# Patient Record
Sex: MALE | Race: WHITE | NOT HISPANIC OR LATINO | URBAN - METROPOLITAN AREA
[De-identification: names, ages, dates, MRNs, and addresses within clinical notes are randomized per-mention and may not be internally consistent; named-entity substitution may affect disease eponyms.]

---

## 2018-10-21 ENCOUNTER — EMERGENCY (EMERGENCY)
Facility: HOSPITAL | Age: 78
LOS: 1 days | Discharge: HOME | End: 2018-10-21
Attending: EMERGENCY MEDICINE | Admitting: HOSPITALIST
Payer: MEDICARE

## 2018-10-21 VITALS
OXYGEN SATURATION: 96 % | SYSTOLIC BLOOD PRESSURE: 122 MMHG | HEART RATE: 67 BPM | RESPIRATION RATE: 16 BRPM | DIASTOLIC BLOOD PRESSURE: 82 MMHG

## 2018-10-21 DIAGNOSIS — E78.5 HYPERLIPIDEMIA, UNSPECIFIED: ICD-10-CM

## 2018-10-21 DIAGNOSIS — N17.9 ACUTE KIDNEY FAILURE, UNSPECIFIED: ICD-10-CM

## 2018-10-21 DIAGNOSIS — R55 SYNCOPE AND COLLAPSE: ICD-10-CM

## 2018-10-21 DIAGNOSIS — I10 ESSENTIAL (PRIMARY) HYPERTENSION: ICD-10-CM

## 2018-10-21 DIAGNOSIS — Z29.9 ENCOUNTER FOR PROPHYLACTIC MEASURES, UNSPECIFIED: ICD-10-CM

## 2018-10-21 LAB
ALBUMIN SERPL ELPH-MCNC: 3.6 G/DL — SIGNIFICANT CHANGE UP (ref 3.3–5)
ALP SERPL-CCNC: 63 U/L — SIGNIFICANT CHANGE UP (ref 40–120)
ALT FLD-CCNC: 8 U/L — LOW (ref 10–45)
ANION GAP SERPL CALC-SCNC: 13 MMOL/L — SIGNIFICANT CHANGE UP (ref 5–17)
AST SERPL-CCNC: 14 U/L — SIGNIFICANT CHANGE UP (ref 10–40)
BASOPHILS # BLD AUTO: 0 K/UL — SIGNIFICANT CHANGE UP (ref 0–0.2)
BASOPHILS NFR BLD AUTO: 0.4 % — SIGNIFICANT CHANGE UP (ref 0–2)
BILIRUB SERPL-MCNC: 0.2 MG/DL — SIGNIFICANT CHANGE UP (ref 0.2–1.2)
BUN SERPL-MCNC: 37 MG/DL — HIGH (ref 7–23)
CALCIUM SERPL-MCNC: 8.8 MG/DL — SIGNIFICANT CHANGE UP (ref 8.4–10.5)
CHLORIDE SERPL-SCNC: 104 MMOL/L — SIGNIFICANT CHANGE UP (ref 96–108)
CO2 SERPL-SCNC: 23 MMOL/L — SIGNIFICANT CHANGE UP (ref 22–31)
CREAT SERPL-MCNC: 1.87 MG/DL — HIGH (ref 0.5–1.3)
D DIMER BLD IA.RAPID-MCNC: 2587 NG/ML DDU — HIGH
EOSINOPHIL # BLD AUTO: 0.1 K/UL — SIGNIFICANT CHANGE UP (ref 0–0.5)
EOSINOPHIL NFR BLD AUTO: 1.4 % — SIGNIFICANT CHANGE UP (ref 0–6)
GLUCOSE SERPL-MCNC: 124 MG/DL — HIGH (ref 70–99)
HCT VFR BLD CALC: 39.8 % — SIGNIFICANT CHANGE UP (ref 39–50)
HGB BLD-MCNC: 13.1 G/DL — SIGNIFICANT CHANGE UP (ref 13–17)
LYMPHOCYTES # BLD AUTO: 1 K/UL — SIGNIFICANT CHANGE UP (ref 1–3.3)
LYMPHOCYTES # BLD AUTO: 11.6 % — LOW (ref 13–44)
MAGNESIUM SERPL-MCNC: 2.2 MG/DL — SIGNIFICANT CHANGE UP (ref 1.6–2.6)
MCHC RBC-ENTMCNC: 28.3 PG — SIGNIFICANT CHANGE UP (ref 27–34)
MCHC RBC-ENTMCNC: 33 GM/DL — SIGNIFICANT CHANGE UP (ref 32–36)
MCV RBC AUTO: 85.9 FL — SIGNIFICANT CHANGE UP (ref 80–100)
MONOCYTES # BLD AUTO: 0.5 K/UL — SIGNIFICANT CHANGE UP (ref 0–0.9)
MONOCYTES NFR BLD AUTO: 5.9 % — SIGNIFICANT CHANGE UP (ref 2–14)
NEUTROPHILS # BLD AUTO: 7.2 K/UL — SIGNIFICANT CHANGE UP (ref 1.8–7.4)
NEUTROPHILS NFR BLD AUTO: 80.7 % — HIGH (ref 43–77)
NT-PROBNP SERPL-SCNC: 745 PG/ML — HIGH (ref 0–300)
PHOSPHATE SERPL-MCNC: 4.2 MG/DL — SIGNIFICANT CHANGE UP (ref 2.5–4.5)
PLATELET # BLD AUTO: 206 K/UL — SIGNIFICANT CHANGE UP (ref 150–400)
POTASSIUM SERPL-MCNC: 4.6 MMOL/L — SIGNIFICANT CHANGE UP (ref 3.5–5.3)
POTASSIUM SERPL-SCNC: 4.6 MMOL/L — SIGNIFICANT CHANGE UP (ref 3.5–5.3)
PROT SERPL-MCNC: 7.2 G/DL — SIGNIFICANT CHANGE UP (ref 6–8.3)
RBC # BLD: 4.63 M/UL — SIGNIFICANT CHANGE UP (ref 4.2–5.8)
RBC # FLD: 13.4 % — SIGNIFICANT CHANGE UP (ref 10.3–14.5)
SODIUM SERPL-SCNC: 140 MMOL/L — SIGNIFICANT CHANGE UP (ref 135–145)
TROPONIN T, HIGH SENSITIVITY RESULT: 22 NG/L — SIGNIFICANT CHANGE UP (ref 0–51)
TROPONIN T, HIGH SENSITIVITY RESULT: 27 NG/L — SIGNIFICANT CHANGE UP (ref 0–51)
WBC # BLD: 8.9 K/UL — SIGNIFICANT CHANGE UP (ref 3.8–10.5)
WBC # FLD AUTO: 8.9 K/UL — SIGNIFICANT CHANGE UP (ref 3.8–10.5)

## 2018-10-21 RX ORDER — ATORVASTATIN CALCIUM 80 MG/1
40 TABLET, FILM COATED ORAL AT BEDTIME
Qty: 0 | Refills: 0 | Status: DISCONTINUED | OUTPATIENT
Start: 2018-10-21 | End: 2018-10-21

## 2018-10-21 RX ORDER — AMLODIPINE BESYLATE 2.5 MG/1
10 TABLET ORAL DAILY
Qty: 0 | Refills: 0 | Status: DISCONTINUED | OUTPATIENT
Start: 2018-10-21 | End: 2018-10-22

## 2018-10-21 RX ORDER — HEPARIN SODIUM 5000 [USP'U]/ML
5000 INJECTION INTRAVENOUS; SUBCUTANEOUS EVERY 8 HOURS
Qty: 0 | Refills: 0 | Status: DISCONTINUED | OUTPATIENT
Start: 2018-10-21 | End: 2018-10-22

## 2018-10-21 RX ORDER — ATORVASTATIN CALCIUM 80 MG/1
80 TABLET, FILM COATED ORAL AT BEDTIME
Qty: 0 | Refills: 0 | Status: DISCONTINUED | OUTPATIENT
Start: 2018-10-21 | End: 2018-10-22

## 2018-10-21 RX ORDER — SODIUM CHLORIDE 9 MG/ML
500 INJECTION INTRAMUSCULAR; INTRAVENOUS; SUBCUTANEOUS ONCE
Qty: 0 | Refills: 0 | Status: COMPLETED | OUTPATIENT
Start: 2018-10-21 | End: 2018-10-21

## 2018-10-21 RX ORDER — SODIUM CHLORIDE 9 MG/ML
1000 INJECTION INTRAMUSCULAR; INTRAVENOUS; SUBCUTANEOUS
Qty: 0 | Refills: 0 | Status: DISCONTINUED | OUTPATIENT
Start: 2018-10-21 | End: 2018-10-21

## 2018-10-21 RX ORDER — SODIUM CHLORIDE 9 MG/ML
1000 INJECTION INTRAMUSCULAR; INTRAVENOUS; SUBCUTANEOUS
Qty: 0 | Refills: 0 | Status: DISCONTINUED | OUTPATIENT
Start: 2018-10-21 | End: 2018-10-22

## 2018-10-21 RX ORDER — METOPROLOL TARTRATE 50 MG
50 TABLET ORAL DAILY
Qty: 0 | Refills: 0 | Status: DISCONTINUED | OUTPATIENT
Start: 2018-10-21 | End: 2018-10-22

## 2018-10-21 RX ORDER — INDOMETHACIN 50 MG
50 CAPSULE ORAL THREE TIMES A DAY
Qty: 0 | Refills: 0 | Status: DISCONTINUED | OUTPATIENT
Start: 2018-10-21 | End: 2018-10-21

## 2018-10-21 RX ORDER — ASPIRIN/CALCIUM CARB/MAGNESIUM 324 MG
81 TABLET ORAL DAILY
Qty: 0 | Refills: 0 | Status: DISCONTINUED | OUTPATIENT
Start: 2018-10-21 | End: 2018-10-22

## 2018-10-21 RX ADMIN — Medication 50 MILLIGRAM(S): at 20:01

## 2018-10-21 RX ADMIN — AMLODIPINE BESYLATE 10 MILLIGRAM(S): 2.5 TABLET ORAL at 20:01

## 2018-10-21 RX ADMIN — SODIUM CHLORIDE 75 MILLILITER(S): 9 INJECTION INTRAMUSCULAR; INTRAVENOUS; SUBCUTANEOUS at 19:45

## 2018-10-21 RX ADMIN — SODIUM CHLORIDE 500 MILLILITER(S): 9 INJECTION INTRAMUSCULAR; INTRAVENOUS; SUBCUTANEOUS at 04:48

## 2018-10-21 NOTE — ED PROVIDER NOTE - MEDICAL DECISION MAKING DETAILS
32M presenting with syncope. denies chest pain, difficulty breathing, headache or dizziness prior to syncopal episode but reports feeling "uncomfortable." was hypoxic to 80s so concern for PE vs cardiac etiology of syncope. plan for cbc, cmp, ekg, d-dimer, trop, vbg, ua, cxr. will reassess. likely admission.

## 2018-10-21 NOTE — ED ADULT NURSE REASSESSMENT NOTE - NS ED NURSE REASSESS COMMENT FT1
Patient's daughter is at bedside and is inquiring about plan of care. Explained to patient's daughter that patient is still to be seen by cardiology MD and by medicine MD who will further discuss plan of care. Still awaiting bed assignment at this time.

## 2018-10-21 NOTE — ED PROVIDER NOTE - ATTENDING CONTRIBUTION TO CARE
77 yo male presents with syncopal episode at home today. Got up from sleep to walk to bathroom and lost consciousness and fell to the ground. Denies prodrome. Upon waking had some nausea but no vomiting. No chest pain or SOB. PE: well appearing, nad MMM, lungs clear, RRR, ab soft nt/nd, no abrasions or lacerations, NCAT. A/P: Will obtain labs, ct head, d-dimer given slightly hypoxic on RA and if positive cta for PE as cause of syncope. Given lack of prodrome concerned for valvular cause vs arrythmia and will admit for further work up.

## 2018-10-21 NOTE — H&P ADULT - NSHPLABSRESULTS_GEN_ALL_CORE
LABS personally reviewed by me and significant for:    WBC = 8.9  Hb = 13.1  Plt = 206      Na = 140  K = 4.6    BUN = 37  Cr = 1.87      Glucose = 124      D -dimer = 2587      EKG: personally reviewed by me = NSR @ 71 bpm, LAD       IMAGING:    CT Head = no acute pathology    CT Angio Chest = no PE LABS personally reviewed by me and significant for:    WBC = 8.9  Hb = 13.1  Plt = 206      Na = 140  K = 4.6    BUN = 37  Cr = 1.87      Glucose = 124      D -dimer = 2587      EKG: personally reviewed by me = NSR @ 71 bpm, LAD       IMAGING:    CT Head = no acute pathology    CT Angio Chest = no PE, bibasilar ILD

## 2018-10-21 NOTE — H&P ADULT - FAMILY HISTORY
Mother  Still living? Unknown  Family history of cardiovascular disease, Age at diagnosis: Age Unknown

## 2018-10-21 NOTE — ED PROVIDER NOTE - PHYSICAL EXAMINATION
General: well appearing male, on acute distress   HEENT: normocephalic, atraumatic   Respiratory: normal work of breathing, lungs clear to auscultation bilaterally   Cardiac: regular rate and rhythm   Abdomen: soft, non-tender   MSK: no swelling or tenderness of lower extremities   Skin: no rashes   Neuro: A&Ox3, cranial nerves II-XII intact, 5/5 strength in all extremities

## 2018-10-21 NOTE — H&P ADULT - NSHPPHYSICALEXAM_GEN_ALL_CORE
Vital Signs Last 24 Hrs  T(C): 37.1 (21 Oct 2018 12:10), Max: 37.1 (21 Oct 2018 12:10)  T(F): 98.8 (21 Oct 2018 12:10), Max: 98.8 (21 Oct 2018 12:10)  HR: 64 (21 Oct 2018 12:10) (64 - 75)  BP: 159/93 (21 Oct 2018 12:10) (116/76 - 159/93)  BP(mean): --  RR: 18 (21 Oct 2018 12:10) (16 - 20)  SpO2: 97% (21 Oct 2018 12:10) (90% - 98%)    Gen - NAD  HEENT - perrla, eomi  CV - s1 and s2, rrr, no m/r/g  LUNGS - CTAB  Abd - soft, nd, nt, +bs  Ext - no c/c/e

## 2018-10-21 NOTE — H&P ADULT - PROBLEM SELECTOR PLAN 2
Cr of 1.87, unknown baseline, no prior data in sunrise, or in allscripts; pt encouraged to stay hydrated at home; cont IVF NS Cr of 1.87, unknown baseline, no prior data in sunrise, or in allscripts; pt encouraged to stay hydrated at home; cont IVF NS  -will hold indomethacin, In setting of VANESSA, indomethacin prescribed for gout; if pt has worsening gout symptoms, may need rheum consult for management

## 2018-10-21 NOTE — H&P ADULT - PROBLEM SELECTOR PLAN 3
cont metoprolol, norvasc, asa CT chest revealed bibasilar interstitial lung disease, currently patient has no symptoms of sob, or dyspnea at home; would follow as outpt with PMD

## 2018-10-21 NOTE — ED ADULT NURSE REASSESSMENT NOTE - NS ED NURSE REASSESS COMMENT FT1
Patient resting comfortably in bed, at this time denies any pain or discomfort. Patient placed in gown and red socks placed on patient. NSR noted on cardiac monitor. Will continue to monitor.

## 2018-10-21 NOTE — H&P ADULT - NSHPREVIEWOFSYSTEMS_GEN_ALL_CORE
- nausea/vomiting  - chest pain, palpitations  - sob, cough  - abd pn, diarrhea, constipation  - fevers/chills  - bleeding/bruising  - headaches, vision changes  - allergies, rhinorrhea  - no skin changes, new rashes + syncope  + LOC    - nausea/vomiting  - chest pain, palpitations  - sob, cough  - abd pn, diarrhea, constipation  - fevers/chills  - bleeding/bruising  - headaches, vision changes  - allergies, rhinorrhea  - no skin changes, new rashes  - temp intolerance, weight changes

## 2018-10-21 NOTE — ED ADULT NURSE NOTE - OBJECTIVE STATEMENT
78 yr old male BIBEMS c/o syncope. HX 2 MIs, ballon angioplasty. per pt he went to bed  and felt restless so he got up to go to watch TV on the couch when he started to feel diaphoretic and nauseous. pt states he fell, unwitnessed and is unsure if he hit his head. pt denies blood thinners. upon assessment pt is a&ox3, pt feels weak and dizzy, does not appear diaphoretic, pt hypoxic on room air 2L nasal cannula applied. pt in NSR on cardiac monitor. IV placed in right AC. pt denies every feeling chest pain, sob, fever, or chills. daughter at bedside

## 2018-10-21 NOTE — ED ADULT NURSE REASSESSMENT NOTE - NS ED NURSE REASSESS COMMENT FT1
pt requesting to ambulate to bathroom. pt educated that he cannot walk due to recent syncopal episode , pt requesting to speak to MD. pt requesting to ambulate to bathroom. pt educated that he cannot walk due to recent syncopal episode , pt requesting to speak to MD. MD aware. urinal provided to patient

## 2018-10-21 NOTE — ED ADULT NURSE REASSESSMENT NOTE - NS ED NURSE REASSESS COMMENT FT1
Patient's daughter at this time states patient does not take Indomethacin TID and also states that there are medications he takes for his ulcerative colitis that are missing from his list of scheduled meds. She would like to hold Indomethacin until the dosing and frequency is confirmed. Patient still awaiting bed assignment at this time. V/S are stable will continue to monitor.

## 2018-10-21 NOTE — H&P ADULT - HISTORY OF PRESENT ILLNESS
79 y/o m w/ PMH of HTN, HLD, UC p/w syncope.  Pt states that yesterday he was trying to sleep, but woke up, and then fainted and passed out.  he feels he may have passed out with LOC for about 30-40 seconds, but it was for less than one minute.  He had some nausea associated with he episode.  He's not sure if he hit his head.  When he awoke, he was completely alert and asymptomatic.  Leading up to the episode, he had no cp, sob, palpitations, diaphoresis, ha, vision changes.  He does not recall any changes in diet or decreased po intake, but does volunteer that in general he does not drink many liquids through out the day.    He had a similar episode about 25-30 yrs ago, and says that after some juice he felt much better.      He has no recent echocardiogram.    In the ED, CTA and CT head were negative.

## 2018-10-21 NOTE — H&P ADULT - PROBLEM SELECTOR PLAN 1
Unclear etiology, vaso vagal versus cardiac verus neurologic; CT head negative for acute pathology, and CT angio chest negative for PE despite elevated D dimer  -trop T negative x 2  -check orthostatics - opt had previous episode of syncope about 25 yrs ago that appears to have been vasovagal base don symptomotology and resolved with juice ingestion  -monitor on tele  -check 2d echo to assess for cardiac valvular or structural abnormalities

## 2018-10-21 NOTE — ED PROVIDER NOTE - OBJECTIVE STATEMENT
78M, pmh of HTN, ulcerative colitis, HLD presenting with syncope. Patient reports he was trying to sleep but was feeling uncomfortable, got out of bed and then fainted. Unclear if hit head. Was unconscious for less than a minute, not confused when awakened. Had nausea. Denies any chest pain, difficulty breathing, headache, changes in vision, pain or burning with urination, pain or swelling in lower extremities. Not on blood thinners. Does not believe he has had a recent cardiac work-up. 78M, pmh of HTN, ulcerative colitis, HLD presenting with syncope. Patient reports he was trying to sleep but was feeling uncomfortable, got out of bed and then fainted. Unclear if hit head. Was unconscious for less than a minute, not confused when awakened. Had nausea. Denies any chest pain, difficulty breathing, headache, changes in vision, pain or burning with urination, pain or swelling in lower extremities. Not on blood thinners. Does not believe he has had a recent cardiac work-up.    PCP: Damon Villasenor

## 2018-10-21 NOTE — ED PROVIDER NOTE - PROGRESS NOTE DETAILS
Cr 1.8, however strong clinical concern for PE given hypoxia, syncope and elevated d-dimer, d/w radiologist, risk vs benefits, will elect to perform CTA at this time. Attending Eddie Turner DO updated patient on results. agrees with plan for admission. - resident Ray Muse updated patient on results. informed of lung nodules on CT. agrees with plan for admission. - resident Ray Muse

## 2018-10-22 ENCOUNTER — TRANSCRIPTION ENCOUNTER (OUTPATIENT)
Age: 78
End: 2018-10-22

## 2018-10-22 VITALS
DIASTOLIC BLOOD PRESSURE: 84 MMHG | HEART RATE: 54 BPM | OXYGEN SATURATION: 95 % | SYSTOLIC BLOOD PRESSURE: 134 MMHG | RESPIRATION RATE: 18 BRPM | TEMPERATURE: 98 F

## 2018-10-22 LAB
ANION GAP SERPL CALC-SCNC: 10 MMOL/L — SIGNIFICANT CHANGE UP (ref 5–17)
BASOPHILS # BLD AUTO: 0.02 K/UL — SIGNIFICANT CHANGE UP (ref 0–0.2)
BASOPHILS NFR BLD AUTO: 0.3 % — SIGNIFICANT CHANGE UP (ref 0–2)
BUN SERPL-MCNC: 23 MG/DL — SIGNIFICANT CHANGE UP (ref 7–23)
CALCIUM SERPL-MCNC: 8.6 MG/DL — SIGNIFICANT CHANGE UP (ref 8.4–10.5)
CHLORIDE SERPL-SCNC: 105 MMOL/L — SIGNIFICANT CHANGE UP (ref 96–108)
CO2 SERPL-SCNC: 24 MMOL/L — SIGNIFICANT CHANGE UP (ref 22–31)
CREAT SERPL-MCNC: 1.29 MG/DL — SIGNIFICANT CHANGE UP (ref 0.5–1.3)
EOSINOPHIL # BLD AUTO: 0.09 K/UL — SIGNIFICANT CHANGE UP (ref 0–0.5)
EOSINOPHIL NFR BLD AUTO: 1.1 % — SIGNIFICANT CHANGE UP (ref 0–6)
GLUCOSE SERPL-MCNC: 101 MG/DL — HIGH (ref 70–99)
HBA1C BLD-MCNC: 5.8 % — HIGH (ref 4–5.6)
HCT VFR BLD CALC: 39 % — SIGNIFICANT CHANGE UP (ref 39–50)
HGB BLD-MCNC: 12.9 G/DL — LOW (ref 13–17)
IMM GRANULOCYTES NFR BLD AUTO: 0.3 % — SIGNIFICANT CHANGE UP (ref 0–1.5)
LYMPHOCYTES # BLD AUTO: 1.34 K/UL — SIGNIFICANT CHANGE UP (ref 1–3.3)
LYMPHOCYTES # BLD AUTO: 17.1 % — SIGNIFICANT CHANGE UP (ref 13–44)
MAGNESIUM SERPL-MCNC: 2 MG/DL — SIGNIFICANT CHANGE UP (ref 1.6–2.6)
MCHC RBC-ENTMCNC: 29 PG — SIGNIFICANT CHANGE UP (ref 27–34)
MCHC RBC-ENTMCNC: 33.1 GM/DL — SIGNIFICANT CHANGE UP (ref 32–36)
MCV RBC AUTO: 87.6 FL — SIGNIFICANT CHANGE UP (ref 80–100)
MONOCYTES # BLD AUTO: 0.52 K/UL — SIGNIFICANT CHANGE UP (ref 0–0.9)
MONOCYTES NFR BLD AUTO: 6.6 % — SIGNIFICANT CHANGE UP (ref 2–14)
NEUTROPHILS # BLD AUTO: 5.85 K/UL — SIGNIFICANT CHANGE UP (ref 1.8–7.4)
NEUTROPHILS NFR BLD AUTO: 74.6 % — SIGNIFICANT CHANGE UP (ref 43–77)
PHOSPHATE SERPL-MCNC: 3.4 MG/DL — SIGNIFICANT CHANGE UP (ref 2.5–4.5)
PLATELET # BLD AUTO: 188 K/UL — SIGNIFICANT CHANGE UP (ref 150–400)
POTASSIUM SERPL-MCNC: 4.3 MMOL/L — SIGNIFICANT CHANGE UP (ref 3.5–5.3)
POTASSIUM SERPL-SCNC: 4.3 MMOL/L — SIGNIFICANT CHANGE UP (ref 3.5–5.3)
RBC # BLD: 4.45 M/UL — SIGNIFICANT CHANGE UP (ref 4.2–5.8)
RBC # FLD: 14.8 % — HIGH (ref 10.3–14.5)
SODIUM SERPL-SCNC: 139 MMOL/L — SIGNIFICANT CHANGE UP (ref 135–145)
WBC # BLD: 7.84 K/UL — SIGNIFICANT CHANGE UP (ref 3.8–10.5)
WBC # FLD AUTO: 7.84 K/UL — SIGNIFICANT CHANGE UP (ref 3.8–10.5)

## 2018-10-22 PROCEDURE — 93005 ELECTROCARDIOGRAM TRACING: CPT

## 2018-10-22 PROCEDURE — 82962 GLUCOSE BLOOD TEST: CPT

## 2018-10-22 PROCEDURE — 84484 ASSAY OF TROPONIN QUANT: CPT

## 2018-10-22 PROCEDURE — 71275 CT ANGIOGRAPHY CHEST: CPT

## 2018-10-22 PROCEDURE — 85379 FIBRIN DEGRADATION QUANT: CPT

## 2018-10-22 PROCEDURE — 99285 EMERGENCY DEPT VISIT HI MDM: CPT | Mod: 25

## 2018-10-22 PROCEDURE — 36415 COLL VENOUS BLD VENIPUNCTURE: CPT

## 2018-10-22 PROCEDURE — 85027 COMPLETE CBC AUTOMATED: CPT

## 2018-10-22 PROCEDURE — 83036 HEMOGLOBIN GLYCOSYLATED A1C: CPT

## 2018-10-22 PROCEDURE — 80048 BASIC METABOLIC PNL TOTAL CA: CPT

## 2018-10-22 PROCEDURE — 84100 ASSAY OF PHOSPHORUS: CPT

## 2018-10-22 PROCEDURE — 80053 COMPREHEN METABOLIC PANEL: CPT

## 2018-10-22 PROCEDURE — 71045 X-RAY EXAM CHEST 1 VIEW: CPT

## 2018-10-22 PROCEDURE — 70450 CT HEAD/BRAIN W/O DYE: CPT

## 2018-10-22 PROCEDURE — 83735 ASSAY OF MAGNESIUM: CPT

## 2018-10-22 PROCEDURE — 83880 ASSAY OF NATRIURETIC PEPTIDE: CPT

## 2018-10-22 RX ADMIN — Medication 81 MILLIGRAM(S): at 13:17

## 2018-10-22 RX ADMIN — AMLODIPINE BESYLATE 10 MILLIGRAM(S): 2.5 TABLET ORAL at 10:08

## 2018-10-22 RX ADMIN — Medication 50 MILLIGRAM(S): at 10:08

## 2018-10-22 NOTE — DISCHARGE NOTE ADULT - ADDITIONAL INSTRUCTIONS
Follow up with PCP Dr. Villasenor in 1 week  You will need a repeat CT Chest in 6 months to monitor nodules found in right lung  Follow up with PCP Dr. Villasenor to schedule blood work to monitor your kidney function Follow up with PCP Dr. Villasenor in 1 week - you need an echocardiogram done as an outpatient  You will need a repeat CT Chest in 6 months to monitor nodules found in right lung  Follow up with PCP Dr. Villasenor to schedule blood work to monitor your kidney function

## 2018-10-22 NOTE — DISCHARGE NOTE ADULT - HOSPITAL COURSE
77 y/o m w/ PMH of HTN, HLD, UC p/w syncope. CT head negative for acute pathology, and CT angio chest negative for PE despite elevated D dimer  -trop T negative x 2. Orthostatics neg. Pt found to VANESSA 2/2 dehydration - resolved with IVF. CT chest revealed bibasilar interstitial lung disease, currently patient has no symptoms of sob, or dyspnea at home; would follow as outpt with PMD. Patient medically cleared for discharge home by attending Dr. Castano with outpatient PCP follow up. 78 year old male PMH of HTN, HLD presents to ER with syncope. CT head negative for acute pathology. CT angio chest negative for PE despite elevated D dimer. See copy of CT chest report. Troponins negative x 2. Orthostatics neg.     Pt found to have VANESSA on CKD III from dehydration which resolved with IVF. Creatinine 1.87 on arrival   and 1.29 by discharge. Tele was NSR. EKG NSR with normal intervals. CXR normal.     Patient medically cleared for discharge home by attending Dr. Castano with outpatient PCP follow up.   Repeat CT chest non-contrast can be done in 12 months. See med list. 78 year old male PMH of HTN, HLD presents to ER with syncope. CT head negative for acute pathology. CT angio chest negative for PE despite elevated D dimer. No pneumonia was seen. See copy of CT chest report. Troponins negative x 2. Orthostatics neg.     Pt found to have VANESSA on CKD III from dehydration which resolved with IVF. Creatinine 1.87 on arrival   and 1.29 by discharge. Patient admits to drinking less over past few days. Tele was NSR. EKG NSR with normal intervals. CXR normal. No infections were found.     Patient medically cleared for discharge home by attending Dr. Castano with outpatient PCP follow up.   Repeat CT chest non-contrast can be done in 12 months. See med list. All prior meds were continued.

## 2018-10-22 NOTE — DISCHARGE NOTE ADULT - CARE PLAN
Principal Discharge DX:	Syncope, unspecified syncope type  Goal:	Prevent further episodes  Assessment and plan of treatment:	HOME CARE INSTRUCTIONS  Have someone stay with you until you feel stable.  Do not drive, operate machinery, or play sports until your caregiver says it is okay.  Keep all follow-up appointments as directed by your caregiver.   Lie down right away if you start feeling like you might faint. Breathe deeply and steadily. Wait until all the symptoms have passed.Drink enough fluids to keep your urine clear or pale yellow.  If you are taking blood pressure or heart medicine, get up slowly, taking several minutes to sit and then stand. This can reduce dizziness.  SEEK IMMEDIATE MEDICAL CARE IF:  You have a severe headache.  You have unusual pain in the chest, abdomen, or back.  You are bleeding from the mouth or rectum, or you have black or tarry stool.  You have an irregular or very fast heartbeat.  You have pain with breathing.  You have repeated fainting or seizure-like jerking during an episode.  You faint when sitting or lying down.  You have confusion.  You have difficulty walking.  You have severe weakness.  You have vision problems.  If you fainted, call your local emergency services. Do not drive yourself to the hospital  Secondary Diagnosis:	VANESSA (acute kidney injury)  Goal:	Resolved  Assessment and plan of treatment:	You were dehydrated and caused your kidney function to worsen  Your kidney function has improved back to normal after receiving IV fluids. You have stay well hydrated at home especially over the next few days  Follow up with PCP Dr. Villasenor to schedule blood work to monitor your kidney function  Secondary Diagnosis:	Essential hypertension  Goal:	Stable  Assessment and plan of treatment:	Take medications for your blood pressure as recommended.  Eat a heart healthy diet that is low in saturated fats and salt, and includes whole grains, fruits, vegetables and lean protein   Exercise regularly (consult with your physician or cardiologist first); maintain a heart healthy weight.   If you smoke - quit (A resource to help you stop smoking is the [facility] – phone number 032-972-7430.). Continue to follow with your primary physician or cardiologist.   Seek medical help for dizziness, Lightheadedness, Blurry vision, Headache, Chest pain, Shortness of breath  Follow up with your medical doctor to establish long term blood pressure treatment goals.  Secondary Diagnosis:	Interstitial lung disease  Goal:	Monitor as outpatient  Assessment and plan of treatment:	Follow up with PCP Dr. Villasenor for a repeat CT Chest in 6 months to monitor nodules found in right lung and findings of interstitial lung disease in both lungs Principal Discharge DX:	Syncope, unspecified syncope type  Goal:	Prevent further episodes  Assessment and plan of treatment:	Follow up with PCP Dr. Villasenor in 1 week - you need an echocardiogram done as an outpatient  HOME CARE INSTRUCTIONS  Have someone stay with you until you feel stable.  Do not drive, operate machinery, or play sports until your caregiver says it is okay.  Keep all follow-up appointments as directed by your caregiver.   Lie down right away if you start feeling like you might faint. Breathe deeply and steadily. Wait until all the symptoms have passed.Drink enough fluids to keep your urine clear or pale yellow.  If you are taking blood pressure or heart medicine, get up slowly, taking several minutes to sit and then stand. This can reduce dizziness.  SEEK IMMEDIATE MEDICAL CARE IF:  You have a severe headache.  You have unusual pain in the chest, abdomen, or back.  You are bleeding from the mouth or rectum, or you have black or tarry stool.  You have an irregular or very fast heartbeat.  You have pain with breathing.  You have repeated fainting or seizure-like jerking during an episode.  You faint when sitting or lying down.  You have confusion.  You have difficulty walking.  You have severe weakness.  You have vision problems.  If you fainted, call your local emergency services. Do not drive yourself to the hospital  Secondary Diagnosis:	VANESSA (acute kidney injury)  Goal:	Resolved  Assessment and plan of treatment:	You were dehydrated and caused your kidney function to worsen  Your kidney function has improved back to normal after receiving IV fluids. You have stay well hydrated at home especially over the next few days  Follow up with PCP Dr. Villasenor to schedule blood work to monitor your kidney function  Secondary Diagnosis:	Essential hypertension  Goal:	Stable  Assessment and plan of treatment:	Take medications for your blood pressure as recommended.  Eat a heart healthy diet that is low in saturated fats and salt, and includes whole grains, fruits, vegetables and lean protein   Exercise regularly (consult with your physician or cardiologist first); maintain a heart healthy weight.   If you smoke - quit (A resource to help you stop smoking is the [facility] – phone number 024-240-2978.). Continue to follow with your primary physician or cardiologist.   Seek medical help for dizziness, Lightheadedness, Blurry vision, Headache, Chest pain, Shortness of breath  Follow up with your medical doctor to establish long term blood pressure treatment goals.  Secondary Diagnosis:	Interstitial lung disease  Goal:	Monitor as outpatient  Assessment and plan of treatment:	Follow up with PCP Dr. Villasenor for a repeat CT Chest in 6 months to monitor nodules found in right lung and findings of interstitial lung disease in both lungs

## 2018-10-22 NOTE — DISCHARGE NOTE ADULT - CARE PROVIDER_API CALL
Damon Villasenor (MD), Internal Medicine  [address] [place] [address]  Phone: [phone]  Fax: [phone]

## 2018-10-22 NOTE — DISCHARGE NOTE ADULT - PLAN OF CARE
Prevent further episodes HOME CARE INSTRUCTIONS  Have someone stay with you until you feel stable.  Do not drive, operate machinery, or play sports until your caregiver says it is okay.  Keep all follow-up appointments as directed by your caregiver.   Lie down right away if you start feeling like you might faint. Breathe deeply and steadily. Wait until all the symptoms have passed.Drink enough fluids to keep your urine clear or pale yellow.  If you are taking blood pressure or heart medicine, get up slowly, taking several minutes to sit and then stand. This can reduce dizziness.  SEEK IMMEDIATE MEDICAL CARE IF:  You have a severe headache.  You have unusual pain in the chest, abdomen, or back.  You are bleeding from the mouth or rectum, or you have black or tarry stool.  You have an irregular or very fast heartbeat.  You have pain with breathing.  You have repeated fainting or seizure-like jerking during an episode.  You faint when sitting or lying down.  You have confusion.  You have difficulty walking.  You have severe weakness.  You have vision problems.  If you fainted, call your local emergency services. Do not drive yourself to the hospital Resolved You were dehydrated and caused your kidney function to worsen  Your kidney function has improved back to normal after receiving IV fluids. You have stay well hydrated at home especially over the next few days  Follow up with PCP Dr. Villasenor to schedule blood work to monitor your kidney function Stable Take medications for your blood pressure as recommended.  Eat a heart healthy diet that is low in saturated fats and salt, and includes whole grains, fruits, vegetables and lean protein   Exercise regularly (consult with your physician or cardiologist first); maintain a heart healthy weight.   If you smoke - quit (A resource to help you stop smoking is the [facility] – phone number 179-854-7317.). Continue to follow with your primary physician or cardiologist.   Seek medical help for dizziness, Lightheadedness, Blurry vision, Headache, Chest pain, Shortness of breath  Follow up with your medical doctor to establish long term blood pressure treatment goals. Monitor as outpatient Follow up with PCP Dr. Villasenor for a repeat CT Chest in 6 months to monitor nodules found in right lung and findings of interstitial lung disease in both lungs Follow up with PCP Dr. Villasenor in 1 week - you need an echocardiogram done as an outpatient  HOME CARE INSTRUCTIONS  Have someone stay with you until you feel stable.  Do not drive, operate machinery, or play sports until your caregiver says it is okay.  Keep all follow-up appointments as directed by your caregiver.   Lie down right away if you start feeling like you might faint. Breathe deeply and steadily. Wait until all the symptoms have passed.Drink enough fluids to keep your urine clear or pale yellow.  If you are taking blood pressure or heart medicine, get up slowly, taking several minutes to sit and then stand. This can reduce dizziness.  SEEK IMMEDIATE MEDICAL CARE IF:  You have a severe headache.  You have unusual pain in the chest, abdomen, or back.  You are bleeding from the mouth or rectum, or you have black or tarry stool.  You have an irregular or very fast heartbeat.  You have pain with breathing.  You have repeated fainting or seizure-like jerking during an episode.  You faint when sitting or lying down.  You have confusion.  You have difficulty walking.  You have severe weakness.  You have vision problems.  If you fainted, call your local emergency services. Do not drive yourself to the hospital

## 2018-10-22 NOTE — PROGRESS NOTE ADULT - SUBJECTIVE AND OBJECTIVE BOX
INTERVAL HPI/OVERNIGHT EVENTS:  Pt seen and examined at bedside.     Allergies/Intolerance: tetracycline (Rash)      MEDICATIONS  (STANDING):  amLODIPine   Tablet 10 milliGRAM(s) Oral daily  aspirin  chewable 81 milliGRAM(s) Oral daily  atorvastatin 80 milliGRAM(s) Oral at bedtime  heparin  Injectable 5000 Unit(s) SubCutaneous every 8 hours  metoprolol succinate ER 50 milliGRAM(s) Oral daily  sodium chloride 0.9%. 1000 milliLiter(s) (75 mL/Hr) IV Continuous <Continuous>    MEDICATIONS  (PRN):        ROS: all systems reviewed and wnl      PHYSICAL EXAMINATION:  Vital Signs Last 24 Hrs  T(C): 36.9 (22 Oct 2018 10:05), Max: 37.1 (21 Oct 2018 12:10)  T(F): 98.5 (22 Oct 2018 10:05), Max: 98.8 (21 Oct 2018 12:10)  HR: 66 (22 Oct 2018 10:05) (64 - 80)  BP: 158/85 (22 Oct 2018 10:05) (109/70 - 159/93)  BP(mean): --  RR: 18 (22 Oct 2018 10:05) (16 - 18)  SpO2: 96% (22 Oct 2018 10:05) (96% - 97%)  CAPILLARY BLOOD GLUCOSE            GENERAL:   NECK: supple, No JVD  CHEST/LUNG: clear to auscultation bilaterally; no rales, rhonchi, or wheezing b/l  HEART: normal S1, S2  ABDOMEN: BS+, soft, ND, NT   EXTREMITIES:  pulses palpable; no clubbing, cyanosis, or edema b/l LEs  SKIN: no rashes or lesions      LABS:                        12.9   7.84  )-----------( 188      ( 22 Oct 2018 06:24 )             39.0     10-22    139  |  105  |  23  ----------------------------<  101<H>  4.3   |  24  |  1.29    Ca    8.6      22 Oct 2018 04:27  Phos  3.4     10-22  Mg     2.0     10-22    TPro  7.2  /  Alb  3.6  /  TBili  0.2  /  DBili  x   /  AST  14  /  ALT  8<L>  /  AlkPhos  63  10-21 INTERVAL HPI/OVERNIGHT EVENTS:  Pt seen and examined at bedside.     Allergies/Intolerance: tetracycline (Rash)      MEDICATIONS  (STANDING):  amLODIPine   Tablet 10 milliGRAM(s) Oral daily  aspirin  chewable 81 milliGRAM(s) Oral daily  atorvastatin 80 milliGRAM(s) Oral at bedtime  heparin  Injectable 5000 Unit(s) SubCutaneous every 8 hours  metoprolol succinate ER 50 milliGRAM(s) Oral daily  sodium chloride 0.9%. 1000 milliLiter(s) (75 mL/Hr) IV Continuous <Continuous>    MEDICATIONS  (PRN):        ROS: all systems reviewed and wnl      PHYSICAL EXAMINATION:  Vital Signs Last 24 Hrs  T(C): 36.9 (22 Oct 2018 10:05), Max: 37.1 (21 Oct 2018 12:10)  T(F): 98.5 (22 Oct 2018 10:05), Max: 98.8 (21 Oct 2018 12:10)  HR: 66 (22 Oct 2018 10:05) (64 - 80)  BP: 158/85 (22 Oct 2018 10:05) (109/70 - 159/93)  BP(mean): --  RR: 18 (22 Oct 2018 10:05) (16 - 18)  SpO2: 96% (22 Oct 2018 10:05) (96% - 97%)  CAPILLARY BLOOD GLUCOSE            GENERAL: stable in ER, comfortable, no fevers, CP or SOB  NECK: supple, No JVD  CHEST/LUNG: clear to auscultation bilaterally; no rales, rhonchi, or wheezing b/l  HEART: normal S1, S2  ABDOMEN: BS+, soft, ND, NT   EXTREMITIES:  pulses palpable; no clubbing, cyanosis, or edema b/l LEs  SKIN: no rashes or lesions      LABS:                        12.9   7.84  )-----------( 188      ( 22 Oct 2018 06:24 )             39.0     10-22    139  |  105  |  23  ----------------------------<  101<H>  4.3   |  24  |  1.29    Ca    8.6      22 Oct 2018 04:27  Phos  3.4     10-22  Mg     2.0     10-22    TPro  7.2  /  Alb  3.6  /  TBili  0.2  /  DBili  x   /  AST  14  /  ALT  8<L>  /  AlkPhos  63  10-21

## 2018-10-22 NOTE — PROGRESS NOTE ADULT - ASSESSMENT
Discharge to home. CTA no PE, mild ILD at bases, no SOB. Labs, troponins all normal.     VANESSA from dehydration all resolved by discharge. See med list and hospital summary.      Outpatient  TTE and CT chest in 6 months.

## 2018-10-22 NOTE — DISCHARGE NOTE ADULT - MEDICATION SUMMARY - MEDICATIONS TO TAKE
I will START or STAY ON the medications listed below when I get home from the hospital:    aspirin 81 mg oral tablet  -- 1 tab(s) by mouth once a day  -- Indication: For Cardiac Health    Crestor 40 mg oral tablet  -- 1 tab(s) by mouth once a day (at bedtime)  -- Indication: For Hyperlipidemia    metoprolol succinate 50 mg oral tablet, extended release  -- 1 tab(s) by mouth once a day  -- Indication: For Essential hypertension    albuterol CFC free 90 mcg/inh inhalation aerosol  -- 1 - 2 puff(s) inhaled every 4 hours as needed cough  -- For inhalation only.  It is very important that you take or use this exactly as directed.  Do not skip doses or discontinue unless directed by your doctor.  Obtain medical advice before taking any non-prescription drugs as some may affect the action of this medication.  Shake well before use.    -- Indication: For Interstitial lung disease    Norvasc 10 mg oral tablet  -- 1 tab(s) by mouth once a day  -- Indication: For Essential hypertension

## 2018-10-22 NOTE — DISCHARGE NOTE ADULT - PATIENT PORTAL LINK FT
You can access the [facility] Patient Portal, offered by [facility], by registering with the following website: [email or web address]

## 2018-10-25 RX ORDER — ROSUVASTATIN CALCIUM 5 MG/1
1 TABLET ORAL
Qty: 0 | Refills: 0 | COMMUNITY

## 2018-10-25 RX ORDER — METOPROLOL TARTRATE 50 MG
1 TABLET ORAL
Qty: 0 | Refills: 0 | COMMUNITY

## 2018-10-25 RX ORDER — AMLODIPINE BESYLATE 2.5 MG/1
1 TABLET ORAL
Qty: 0 | Refills: 0 | COMMUNITY

## 2018-10-25 RX ORDER — ASPIRIN/CALCIUM CARB/MAGNESIUM 324 MG
1 TABLET ORAL
Qty: 0 | Refills: 0 | COMMUNITY

## 2018-10-25 RX ORDER — ATORVASTATIN CALCIUM 80 MG/1
1 TABLET, FILM COATED ORAL
Qty: 0 | Refills: 0 | COMMUNITY

## 2018-10-25 RX ORDER — LEVOTHYROXINE SODIUM 125 MCG
1 TABLET ORAL
Qty: 0 | Refills: 0 | COMMUNITY

## 2020-12-17 ENCOUNTER — FORM ENCOUNTER (OUTPATIENT)
Age: 80
End: 2020-12-17

## 2020-12-18 ENCOUNTER — TRANSCRIPTION ENCOUNTER (OUTPATIENT)
Age: 80
End: 2020-12-18

## 2020-12-19 ENCOUNTER — APPOINTMENT (OUTPATIENT)
Dept: DISASTER EMERGENCY | Facility: HOSPITAL | Age: 80
End: 2020-12-19

## 2020-12-19 ENCOUNTER — OUTPATIENT (OUTPATIENT)
Dept: OUTPATIENT SERVICES | Facility: HOSPITAL | Age: 80
LOS: 1 days | End: 2020-12-19
Payer: MEDICARE

## 2020-12-19 VITALS
TEMPERATURE: 98 F | HEART RATE: 83 BPM | SYSTOLIC BLOOD PRESSURE: 139 MMHG | OXYGEN SATURATION: 98 % | DIASTOLIC BLOOD PRESSURE: 93 MMHG | RESPIRATION RATE: 18 BRPM

## 2020-12-19 VITALS
OXYGEN SATURATION: 94 % | TEMPERATURE: 98 F | HEART RATE: 71 BPM | DIASTOLIC BLOOD PRESSURE: 99 MMHG | RESPIRATION RATE: 18 BRPM | SYSTOLIC BLOOD PRESSURE: 157 MMHG

## 2020-12-19 DIAGNOSIS — U07.1 COVID-19: ICD-10-CM

## 2020-12-19 PROCEDURE — M0239: CPT

## 2020-12-19 RX ORDER — BAMLANIVIMAB 35 MG/ML
700 INJECTION, SOLUTION INTRAVENOUS ONCE
Refills: 0 | Status: COMPLETED | OUTPATIENT
Start: 2020-12-19 | End: 2020-12-19

## 2020-12-19 RX ADMIN — BAMLANIVIMAB 200 MILLIGRAM(S): 35 INJECTION, SOLUTION INTRAVENOUS at 13:51

## 2020-12-19 NOTE — MONOCLONAL ANTIBODY INFUSION - ASSESSMENT AND PLAN
ASSESSMENT:  Pt is a 81 yo male  Covid +  referred by Dr Villasenor who presents to infusion center for Monoclonal antibody infusion (Bamlanivimab)  Symptoms/ Criteria: Cough, Muscle aches, malaise  Risk Profile includes: Age greater than 65, HTN, CAD, Hyperlipidemia    PLAN:  - infusion procedure explained to patient   -Consent for monoclonal antibody infusion obtained   - Risk & benifits discussed/all questions answered  -infuse  Bamlanivimab 700mg  IV over one hour   -observe patient for one hour post infusion

## 2020-12-19 NOTE — MONOCLONAL ANTIBODY INFUSION - EXAM
CC: Monoclonal Antibody Infusion/COVID 19 Positive  80yMale with COVID symptoms since 12/15 of muscle aches, fatigue and cough    exam/findings:  T(C): 36.6 (12-19-20 @ 12:38), Max: 36.6 (12-19-20 @ 12:38)  HR: 83 (12-19-20 @ 12:38) (83 - 83)  BP: 139/93 (12-19-20 @ 12:38) (139/93 - 139/93)  RR: 18 (12-19-20 @ 12:38) (18 - 18)  SpO2: 98% (12-19-20 @ 12:38) (98% - 98%)      PE:   Appearance: NAD	  HEENT:   Normal oral mucosa,   Lymphatic: No lymphadenopathy  Cardiovascular: Normal S1 S2, No JVD, No murmurs, No edema  Respiratory: Lungs clear to auscultation	  Gastrointestinal:  Soft, Non-tender, + BS	  Skin: warm and dry  Neurologic: Non-focal  Extremities: Normal range of motion,

## 2020-12-19 NOTE — MONOCLONAL ANTIBODY INFUSION - HOME MEDICATIONS
Norvasc 10 mg oral tablet , 1 tab(s) orally once a day  Crestor 40 mg oral tablet , 1 tab(s) orally once a day (at bedtime)  metoprolol succinate 50 mg oral tablet, extended release , 1 tab(s) orally once a day  aspirin 81 mg oral tablet , 1 tab(s) orally once a day  albuterol CFC free 90 mcg/inh inhalation aerosol , 1 - 2 puff(s) inhaled every 4 hours as needed cough

## 2020-12-21 ENCOUNTER — TRANSCRIPTION ENCOUNTER (OUTPATIENT)
Age: 80
End: 2020-12-21

## 2021-12-08 NOTE — H&P ADULT - NSHPPOADEEPVENOUSTHROMB_GEN_A_CORE
no